# Patient Record
Sex: MALE | Race: WHITE | NOT HISPANIC OR LATINO | URBAN - METROPOLITAN AREA
[De-identification: names, ages, dates, MRNs, and addresses within clinical notes are randomized per-mention and may not be internally consistent; named-entity substitution may affect disease eponyms.]

---

## 2021-08-27 ENCOUNTER — OFFICE (OUTPATIENT)
Dept: URBAN - METROPOLITAN AREA CLINIC 11 | Facility: CLINIC | Age: 56
End: 2021-08-27

## 2021-08-27 VITALS
DIASTOLIC BLOOD PRESSURE: 78 MMHG | WEIGHT: 188.8 LBS | HEIGHT: 78 IN | OXYGEN SATURATION: 98 % | SYSTOLIC BLOOD PRESSURE: 124 MMHG | HEART RATE: 72 BPM

## 2021-08-27 DIAGNOSIS — K57.90 DIVERTICULOSIS OF INTESTINE, PART UNSPECIFIED, WITHOUT PERFO: ICD-10-CM

## 2021-08-27 DIAGNOSIS — Z86.010 PERSONAL HISTORY OF COLONIC POLYPS: ICD-10-CM

## 2021-08-27 PROCEDURE — 99203 OFFICE O/P NEW LOW 30 MIN: CPT | Performed by: INTERNAL MEDICINE

## 2021-08-27 RX ORDER — SODIUM SULFATE, POTASSIUM SULFATE, MAGNESIUM SULFATE 17.5; 3.13; 1.6 G/ML; G/ML; G/ML
SOLUTION, CONCENTRATE ORAL
Qty: 1 | Refills: 0 | Status: COMPLETED
Start: 2021-08-27 | End: 2021-11-19

## 2021-11-19 ENCOUNTER — AMBULATORY SURGICAL CENTER (OUTPATIENT)
Dept: URBAN - METROPOLITAN AREA SURGERY 3 | Facility: SURGERY | Age: 56
End: 2021-11-19

## 2021-11-19 ENCOUNTER — AMBULATORY SURGICAL CENTER (OUTPATIENT)
Dept: URBAN - METROPOLITAN AREA SURGERY 3 | Facility: SURGERY | Age: 56
End: 2021-11-19
Payer: COMMERCIAL

## 2021-11-19 VITALS
HEART RATE: 80 BPM | TEMPERATURE: 98.5 F | SYSTOLIC BLOOD PRESSURE: 126 MMHG | SYSTOLIC BLOOD PRESSURE: 132 MMHG | SYSTOLIC BLOOD PRESSURE: 134 MMHG | SYSTOLIC BLOOD PRESSURE: 122 MMHG | SYSTOLIC BLOOD PRESSURE: 126 MMHG | SYSTOLIC BLOOD PRESSURE: 122 MMHG | HEART RATE: 72 BPM | RESPIRATION RATE: 18 BRPM | RESPIRATION RATE: 16 BRPM | OXYGEN SATURATION: 97 % | HEART RATE: 72 BPM | HEART RATE: 80 BPM | HEART RATE: 72 BPM | WEIGHT: 185 LBS | OXYGEN SATURATION: 97 % | HEART RATE: 71 BPM | TEMPERATURE: 97 F | HEART RATE: 80 BPM | RESPIRATION RATE: 16 BRPM | DIASTOLIC BLOOD PRESSURE: 78 MMHG | HEART RATE: 74 BPM | HEART RATE: 82 BPM | HEART RATE: 74 BPM | RESPIRATION RATE: 16 BRPM | OXYGEN SATURATION: 96 % | OXYGEN SATURATION: 97 % | HEIGHT: 73 IN | DIASTOLIC BLOOD PRESSURE: 84 MMHG | DIASTOLIC BLOOD PRESSURE: 87 MMHG | DIASTOLIC BLOOD PRESSURE: 78 MMHG | HEART RATE: 82 BPM | SYSTOLIC BLOOD PRESSURE: 131 MMHG | DIASTOLIC BLOOD PRESSURE: 82 MMHG | SYSTOLIC BLOOD PRESSURE: 134 MMHG | HEART RATE: 82 BPM | OXYGEN SATURATION: 96 % | WEIGHT: 185 LBS | HEIGHT: 73 IN | HEART RATE: 71 BPM | TEMPERATURE: 98.5 F | SYSTOLIC BLOOD PRESSURE: 126 MMHG | SYSTOLIC BLOOD PRESSURE: 132 MMHG | SYSTOLIC BLOOD PRESSURE: 132 MMHG | TEMPERATURE: 98.5 F | DIASTOLIC BLOOD PRESSURE: 82 MMHG | DIASTOLIC BLOOD PRESSURE: 82 MMHG | HEIGHT: 73 IN | DIASTOLIC BLOOD PRESSURE: 84 MMHG | RESPIRATION RATE: 18 BRPM | TEMPERATURE: 97 F | SYSTOLIC BLOOD PRESSURE: 131 MMHG | SYSTOLIC BLOOD PRESSURE: 122 MMHG | HEART RATE: 71 BPM | TEMPERATURE: 97 F | OXYGEN SATURATION: 96 % | DIASTOLIC BLOOD PRESSURE: 87 MMHG | RESPIRATION RATE: 18 BRPM | DIASTOLIC BLOOD PRESSURE: 78 MMHG | DIASTOLIC BLOOD PRESSURE: 87 MMHG | DIASTOLIC BLOOD PRESSURE: 84 MMHG | HEART RATE: 74 BPM | WEIGHT: 185 LBS | SYSTOLIC BLOOD PRESSURE: 134 MMHG | SYSTOLIC BLOOD PRESSURE: 131 MMHG

## 2021-11-19 DIAGNOSIS — Z86.010 PERSONAL HISTORY OF COLONIC POLYPS: ICD-10-CM

## 2021-11-19 DIAGNOSIS — K57.30 DIVERTICULOSIS OF LARGE INTESTINE WITHOUT PERFORATION OR ABS: ICD-10-CM

## 2021-11-19 PROCEDURE — 45378 DIAGNOSTIC COLONOSCOPY: CPT | Performed by: INTERNAL MEDICINE

## 2021-11-19 NOTE — SERVICEHPINOTES
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 I did his 1st colonoscopy which was normal and he had a colonoscopy almost 6 years ago with a polyp and with diverticulosis elsewhere. The prep was not considered good and he was told to come back in 5 years not 10 years. I do not have a path report on the polyp. There is no family history of colon cancer or polyps the places him at higher risk. He has a history of a heart murmur and occasional tachycardia but he exercises frequently and has no problems. He has no chest pain or shortness of breath.

## 2021-11-19 NOTE — SERVICEHPINOTES
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 I did his 1st colonoscopy which was normal and he had a colonoscopy almost 6 years ago with a polyp and with diverticulosis elsewhere. The prep was not considered good and he was told to come back in 5 years not 10 years. I do not have a path report on the polyp. There is no family history of colon cancer or polyps the places him at higher risk. He has a history of a heart murmur and occasional tachycardia but he exercises frequently and has no problems. He has no chest pain or shortness of breath.

## 2021-11-19 NOTE — SERVICEHPINOTES
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 I did his 1st colonoscopy which was normal and he had a colonoscopy almost 6 years ago with a polyp and with diverticulosis elsewhere. The prep was not considered good and he was told to come back in 5 years not 10 years. I do not have a path report on the polyp. There is no family history of colon cancer or polyps the places him at higher risk. He has a history of a heart murmur and occasional tachycardia but he exercises frequently and has no problems. He has no chest pain or shortness of breath.
Detail Level: Simple